# Patient Record
Sex: FEMALE | Race: WHITE | NOT HISPANIC OR LATINO | Employment: STUDENT | URBAN - METROPOLITAN AREA
[De-identification: names, ages, dates, MRNs, and addresses within clinical notes are randomized per-mention and may not be internally consistent; named-entity substitution may affect disease eponyms.]

---

## 2020-01-21 ENCOUNTER — OFFICE VISIT (OUTPATIENT)
Dept: FAMILY MEDICINE CLINIC | Facility: CLINIC | Age: 20
End: 2020-01-21

## 2020-01-21 VITALS
HEART RATE: 107 BPM | RESPIRATION RATE: 18 BRPM | SYSTOLIC BLOOD PRESSURE: 132 MMHG | DIASTOLIC BLOOD PRESSURE: 80 MMHG | WEIGHT: 165 LBS | OXYGEN SATURATION: 98 % | BODY MASS INDEX: 25.9 KG/M2 | TEMPERATURE: 99.8 F | HEIGHT: 67 IN

## 2020-01-21 DIAGNOSIS — J01.90 ACUTE BACTERIAL RHINOSINUSITIS: Primary | ICD-10-CM

## 2020-01-21 DIAGNOSIS — J10.1 INFLUENZA DUE TO INFLUENZA VIRUS, TYPE B: ICD-10-CM

## 2020-01-21 DIAGNOSIS — J11.1 FLU: ICD-10-CM

## 2020-01-21 DIAGNOSIS — B96.89 ACUTE BACTERIAL RHINOSINUSITIS: Primary | ICD-10-CM

## 2020-01-21 LAB
EXPIRATION DATE: ABNORMAL
FLUAV AG NPH QL: NEGATIVE
FLUBV AG NPH QL: POSITIVE
INTERNAL CONTROL: ABNORMAL
Lab: ABNORMAL

## 2020-01-21 PROCEDURE — 99203 OFFICE O/P NEW LOW 30 MIN: CPT | Performed by: NURSE PRACTITIONER

## 2020-01-21 PROCEDURE — 87804 INFLUENZA ASSAY W/OPTIC: CPT | Performed by: NURSE PRACTITIONER

## 2020-01-21 RX ORDER — OSELTAMIVIR PHOSPHATE 75 MG/1
75 CAPSULE ORAL 2 TIMES DAILY
Qty: 10 CAPSULE | Refills: 0 | Status: SHIPPED | OUTPATIENT
Start: 2020-01-21 | End: 2020-01-26

## 2020-01-21 RX ORDER — AMOXICILLIN AND CLAVULANATE POTASSIUM 875; 125 MG/1; MG/1
1 TABLET, FILM COATED ORAL 2 TIMES DAILY
Qty: 14 TABLET | Refills: 0 | Status: SHIPPED | OUTPATIENT
Start: 2020-01-21

## 2020-01-21 RX ORDER — BENZONATATE 200 MG/1
200 CAPSULE ORAL 3 TIMES DAILY PRN
Qty: 30 CAPSULE | Refills: 0 | Status: SHIPPED | OUTPATIENT
Start: 2020-01-21

## 2020-01-21 RX ORDER — AMOXICILLIN 875 MG/1
875 TABLET, COATED ORAL 2 TIMES DAILY
COMMUNITY
End: 2020-01-21

## 2020-01-21 NOTE — PROGRESS NOTES
Subjective   Juve Mistry is a 19 y.o. female.     Chief Complaint   Patient presents with   • Fever   • Sore Throat   • Cough   • Fatigue      HPI new patient to me.  Here with mom to establish care for worsening fever, sore throat, cough.  She began feeling poorly over a week ago and did not seem to be getting over generalized cold-like symptoms and sore throat.  Mom was concerned she might have strep throat and took her to the urgent care on Sunday.  She was already on Bactrim at the time for facial lesions and was told to continue this.  She had a negative strep.  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  She has not had any improvement since starting the amoxicillin and in fact her left ear has begun to be extremely painful.  Last night she developed fever, chills, myalgias.  She does not get the flu vaccine and never has.  No known exposure to flu.    Mom reports ongoing mild depression and anxiety.  Her boyfriend has just left for the [employer] and this has made her more crowe recently.  She was on Lexapro when she was 16 and did very well on it.  She is not sure if she needs to restarted at this point but will consider.    She has an IUD for contraception and denies any problems.  She denies any recent STI exposure and is followed by GYN.    She denies any tobacco, secondhand smoke exposure.  She has never been diagnosed with asthma or COPD.  She does not vape.  She does not smoke marijuana.      Social History     Tobacco Use   • Smoking status: Never Smoker   • Smokeless tobacco: Never Used   Substance Use Topics   • Alcohol use: Never     Frequency: Never   • Drug use: Never       The following portions of the patient's history were reviewed and updated as appropriate: allergies, current medications, past family history, past medical history, past social history, past surgical history and problem list.    Review of Systems  "  Constitutional: Positive for activity change, appetite change, chills, fatigue and fever.   HENT: Positive for congestion, ear pain, postnasal drip, sinus pressure, sinus pain and sore throat. Negative for ear discharge and trouble swallowing.    Respiratory: Positive for cough. Negative for choking, chest tightness and wheezing.    Cardiovascular: Negative for chest pain and palpitations.   Gastrointestinal: Negative for abdominal pain, diarrhea, nausea and vomiting.   Musculoskeletal: Positive for arthralgias and myalgias. Negative for neck pain and neck stiffness.   Neurological: Positive for headaches. Negative for dizziness and weakness.   Psychiatric/Behavioral: Positive for dysphoric mood. Negative for sleep disturbance (Mom feels like she sleeps too much). The patient is nervous/anxious.        Objective   Blood pressure 132/80, pulse 107, temperature 99.8 °F (37.7 °C), temperature source Oral, resp. rate 18, height 170.2 cm (67\"), weight 74.8 kg (165 lb), SpO2 98 %.  Body mass index is 25.84 kg/m².    Physical Exam   Constitutional: She is oriented to person, place, and time. She appears well-developed and well-nourished. No distress.   Is ill-appearing   HENT:   Head: Normocephalic and atraumatic.   Right Ear: External ear and ear canal normal. Tympanic membrane is erythematous and bulging.   Left Ear: External ear and ear canal normal. Tympanic membrane is erythematous and bulging.   Nose: Mucosal edema present. Right sinus exhibits maxillary sinus tenderness and frontal sinus tenderness. Left sinus exhibits maxillary sinus tenderness and frontal sinus tenderness.   Mouth/Throat: Posterior oropharyngeal erythema present. No tonsillar exudate.   Eyes: Conjunctivae are normal. Right eye exhibits no discharge. Left eye exhibits no discharge.   Neck: Neck supple.   Cardiovascular: Normal rate and regular rhythm.   Pulmonary/Chest: Effort normal and breath sounds normal.   Abdominal: Soft. Bowel sounds are " normal. There is no tenderness.   Musculoskeletal: She exhibits no deformity.   Gait smooth and steady   Lymphadenopathy:     She has no cervical adenopathy.   Neurological: She is alert and oriented to person, place, and time.   Skin: Skin is warm and dry. She is not diaphoretic.   Psychiatric: She has a normal mood and affect. Her behavior is normal.   Nursing note and vitals reviewed.      Assessment   Problem List Items Addressed This Visit     None      Visit Diagnoses     Acute bacterial rhinosinusitis    -  Primary    Relevant Medications    amoxicillin-clavulanate (AUGMENTIN) 875-125 MG per tablet    benzonatate (TESSALON) 200 MG capsule    Flu        Relevant Medications    oseltamivir (TAMIFLU) 75 MG capsule    Other Relevant Orders    POC Influenza A / B (Completed)    Influenza due to influenza virus, type B        Relevant Medications    oseltamivir (TAMIFLU) 75 MG capsule           Procedures PHQ-9 Total Score: 7  RASHAWN 7 Total Score: 9         Impression and Plan: We will broaden antibiotics for worsening otitis media while on amoxicillin and rhinosinusitis.  Had antibiotic exposure when young, has been on Bactrim recently.  She was positive for flu B.  She is in her window for treatment with Tamiflu for flu.  We discussed the risks and benefits.  She would like to start the Tamiflu today.  We will give Tessalon for cough.  If this is not helpful she can let me know.  We discussed expected course and resolution as well as signs and symptoms requiring emergent treatment.  Benefits of flu vaccine discussed.  We discussed PHQ and RASHAWN which are both somewhat elevated.  This may be due to her boyfriend leaving.  She and mom will talk it over and if she thinks she needs to restart her Lexapro they will let me know.    Would like her to come back in the next couple months for complete physical.      Health Maintenance Due   Topic Date Due   • ANNUAL PHYSICAL  08/17/2003   • TDAP/TD VACCINES (1 - Tdap)  08/17/2011   • CHLAMYDIA SCREENING  01/21/2020              EMR Dragon/Transcription disclaimer:   Much of this encounter note is an electronic transcription/translation of spoken language to printed text. The electronic translation of spoken language may permit erroneous, or at times, nonsensical words or phrases to be inadvertently transcribed; Although I have reviewed the note for such errors, some may still exist.

## 2020-01-24 ENCOUNTER — TELEPHONE (OUTPATIENT)
Dept: FAMILY MEDICINE CLINIC | Facility: CLINIC | Age: 20
End: 2020-01-24

## 2020-01-24 DIAGNOSIS — J01.90 ACUTE BACTERIAL RHINOSINUSITIS: Primary | ICD-10-CM

## 2020-01-24 DIAGNOSIS — J10.1 INFLUENZA DUE TO INFLUENZA VIRUS, TYPE B: ICD-10-CM

## 2020-01-24 DIAGNOSIS — B96.89 ACUTE BACTERIAL RHINOSINUSITIS: Primary | ICD-10-CM

## 2020-01-24 NOTE — TELEPHONE ENCOUNTER
Advise.    Remember, some of the cough meds are on back order.     Just a reminder so it doesn't get kicked back after hours.

## 2020-01-24 NOTE — TELEPHONE ENCOUNTER
Stephanie-mom states the tessalon perles 200mg are not working for pt's cough. The cough is out of control. Stephanie is requesting a cough syrup with codeine.    [facility] #[phone].    Pls advise.    Stephanie can be reached #[phone].